# Patient Record
Sex: MALE | Race: WHITE | NOT HISPANIC OR LATINO | Employment: OTHER | URBAN - METROPOLITAN AREA
[De-identification: names, ages, dates, MRNs, and addresses within clinical notes are randomized per-mention and may not be internally consistent; named-entity substitution may affect disease eponyms.]

---

## 2023-03-02 ENCOUNTER — TRANSCRIBE ORDERS (OUTPATIENT)
Dept: PHYSICAL THERAPY | Facility: CLINIC | Age: 78
End: 2023-03-02
Payer: MEDICARE

## 2023-03-02 DIAGNOSIS — F03.90 DEMENTIA, UNSPECIFIED DEMENTIA SEVERITY, UNSPECIFIED DEMENTIA TYPE, UNSPECIFIED WHETHER BEHAVIORAL, PSYCHOTIC, OR MOOD DISTURBANCE OR ANXIETY: Primary | ICD-10-CM

## 2023-03-08 ENCOUNTER — OFFICE VISIT (OUTPATIENT)
Dept: PHYSICAL THERAPY | Facility: CLINIC | Age: 78
End: 2023-03-08
Payer: MEDICARE

## 2023-03-08 DIAGNOSIS — F03.90 DEMENTIA, UNSPECIFIED DEMENTIA SEVERITY, UNSPECIFIED DEMENTIA TYPE, UNSPECIFIED WHETHER BEHAVIORAL, PSYCHOTIC, OR MOOD DISTURBANCE OR ANXIETY: ICD-10-CM

## 2023-03-08 DIAGNOSIS — R47.01 APHASIA: ICD-10-CM

## 2023-03-08 DIAGNOSIS — R13.10 DYSPHAGIA, UNSPECIFIED TYPE: ICD-10-CM

## 2023-03-08 DIAGNOSIS — R41.841 COGNITIVE COMMUNICATION DEFICIT: Primary | ICD-10-CM

## 2023-03-08 DIAGNOSIS — R47.1 DYSARTHRIA: ICD-10-CM

## 2023-03-08 PROCEDURE — 92523 SPEECH SOUND LANG COMPREHEN: CPT | Performed by: SPEECH-LANGUAGE PATHOLOGIST

## 2023-03-08 PROCEDURE — 92610 EVALUATE SWALLOWING FUNCTION: CPT | Performed by: SPEECH-LANGUAGE PATHOLOGIST

## 2023-03-09 NOTE — PROGRESS NOTES
Outpatient Speech Language Pathology   Adult Speech Language Cognitive Initial Evaluation   [address]   [place], [place]       Patient Name: Dash Ross  : 1945  MRN: [id number]  Today's Date: 3/10/2023        Visit Date: 2023   There is no problem list on file for this patient.       No past medical history on file.     No past surgical history on file.      Visit Dx:    ICD-10-CM ICD-9-CM   1. Cognitive communication deficit  R41.841 799.52   2. Dementia, unspecified dementia severity, unspecified dementia type, unspecified whether behavioral, psychotic, or mood disturbance or anxiety ([facility])  F03.90 294.20   3. Dysphagia, unspecified type  R13.10 787.20   4. Dysarthria  R47.1 784.51   5. Aphasia  R47.01 784.3            OP SLP Assessment/Plan - 23 1300        SLP Assessment    Functional Problems Speech Language- Adult/Cognition;Swallowing  -RB    Impact on Function: Adult Speech Language/Cognition Difficulty communicating wants, needs, and ideas;Difficulty communicating in a medical emergency;Restrictions in personal and social life;Difficulty sequencing thoughts to express complex messages;Unable to understand written/spoken language;Difficulty following directions;Difficulty sequencing or problem solving to complete ADLs;Lack of insight or awareness of deficits, safety issues;Difficulty participating in avocational activities;Decreased recall of personal information and medical history;Trouble learning or remembering new information;Poor attention to task;Poor judgment;Requires supervision  -RB    Clinical Impression: Speech Language-Adult/Congnition Severe:;Cognitive Communication Impairment;Moderate-Severe:;Expressive Aphasia  -RB    Impact on Function: Swallowing --   WFL -RB    Clinical Impression: Swallowing --   no overt s/sx noted today -RB    Functional Problems Comment No durther evaluation of swallowing indicated at this time. He is experiencing severe negative  impact on QoL due to  cognitive deficits requiring  supervision and causing concerns for safety and life participation.  -RB    Clinical Impression Comments Would benefit from skilled ST for cognition. Swallowing WFL.  -RB    Please refer to paper survey for additional self-reported information Yes  -RB    Please refer to items scanned into chart for additional diagnostic informaiton and handouts as provided by clinician Yes  -RB    SLP Diagnosis cog/comm deficit, dysarthria, aphasia  -RB    Prognosis Fair (comment)  -RB    Patient/caregiver participated in establishment of treatment plan and goals Yes  -RB    Patient would benefit from skilled therapy intervention Yes  -RB       SLP Plan    Frequency 1x weekly  -RB    Duration 8 weeks  -RB    Planned CPT's? SLP INDIVIDUAL SPEECH THERAPY: 79582  -RB    Expected Duration of Therapy Session (SLP Eval) 45  -RB    Plan Comments Dependent on pt admission into [facility] care [facility]  -RB          User Key  (r) = Recorded By, (t) = Taken By, (c) = Cosigned By    Initials Name Provider Type    RB Eri Cleveland, SLP Speech and Language Pathologist                 SLP SLC Evaluation - 03/09/23 0900        Communication Assessment/Intervention    Document Type evaluation  -RB    Total Evaluation Minutes, SLP 45  -RB    Subjective Information no complaints  -RB    Patient Observations alert;cooperative;agree to therapy  -RB    Patient/Family/Caregiver Comments/Observations wife present  -RB    Patient Effort good  -RB    Symptoms Noted During/After Treatment none  -RB       General Information              Precautions/Limitations, Vision WFL with corrective lenses;for purposes of eval  -RB    Precautions/Limitations, Hearing WFL;for purposes of eval  -RB    Prior Level of Function-Communication cognitive-linguistic impairment  -RB    Plans/Goals Discussed with patient;spouse/S.O.  -RB    Barriers to Rehab cognitive status  -RB    Patient's Goals for Discharge patient could not  state  -RB    Family Goals for Discharge --   assess -RB       Expression Assessment/Intervention    Expression Assessment/Intervention --  -RB       Verbal Expression Assessment/Intervention    Conversational Discourse/Fluency severe impairment;perseverations;delayed responses;circumlocution;semantic paraphasias  -RB    Verbal Expression, Comment Confrontational naming and other expressive language to be assessed next session  -RB       Oral Motor Structure and Function    Dentition Assessment upper dentures/partial in place;missing teeth   lower front teeth missing, chronic dental disease -RB    Mucosal Quality moist, healthy  -RB       Oral Musculature and Cranial Nerve Assessment    Oral Motor General Assessment WFL  -RB       Motor Speech Assessment/Intervention    Motor Speech Function moderate impairment;severe impairment  -RB    Characteristics Consistent with Dysarthria slow rate;decreased intensity;decreased breath support;slurred speech  -RB    Conversational Speech (Communication) moderate impairment;severe impairment  -RB    Speech intelligibility 60%;in connected speech;with unfamiliar listener  -RB    Motor Speech, Comment Volume impacts intelligibilty. Speech difficulty with paraphasias impacting intelligibility and overall communication  -RB       Cursory Voice Assessment/Intervention    Quality and Resonance (Voice) breathy;hoarse  -RB    Voice, Comment Pt and spouse report voice has been raspy and quiet for 2-3 years. they do not want to pursue ENT at this time.  -RB       Cognitive Assessment Intervention- SLP    Cognition, Comment Pt demonstrates cognitive deficits but did not get formally evaluated today. Will be evaluated next session for language and other cognitive areas as appropriate.  -RB          User Key  (r) = Recorded By, (t) = Taken By, (c) = Cosigned By    Initials Name Provider Type    Eri Yu SLP Speech and Language Pathologist               SLP Adult Swallow Evaluation  "    Row Name 03/09/23 0800       Rehab Evaluation    Document Type evaluation  -RB    Total Evaluation Minutes, SLP 45  -RB    Subjective Information no complaints  -RB    Patient Observations cooperative;agree to therapy;alert  -RB    Patient/Family/Caregiver Comments/Observations pt's wife present  -RB    Patient Effort excellent  -RB    Symptoms Noted During/After Treatment none  -RB       General Information    Patient Profile Reviewed yes  -RB    Pertinent History Of Current Problem PMH: dementia. Interview was conducted with pt and spouse. He is experiencing decreased word finding and exhibiting jagron speech. Pt demonstrated quiet vocal quality, and pt and spouse report his voice has been quiet and raspy for 2-3 years. Spouse reported pt difficulty with swallowing pills with liquid and states that he has not tried to take them with applesauce. Spouse states that pt is \"a slow eater,\" feels that he chews his food for a long time, and exhibits \"a lot of mouth movements\" when eating. Spouse and pt report no coughing during or after eating. No hx of aspiration  PNA. Per spouse, pt takes small bites of food and has no food restrictions. Spouse states that pt \"had a good physical\" last week. Vision WFL with glasses. Hearing WFL. Pt goes to an adult day center and may possibly be moving into a memory care unit.  -RB    Current Method of Nutrition regular textures  -RB    Precautions/Limitations, Vision WFL with corrective lenses;for purposes of eval  -RB    Precautions/Limitations, Hearing WFL;for purposes of eval  -RB    Prior Level of Function-Communication WFL  -RB    Prior Level of Function-Swallowing no diet consistency restrictions  -RB    Plans/Goals Discussed with patient;spouse/S.O.  -RB    Barriers to Rehab cognitive status  -RB    Patient's Goals for Discharge patient could not state  -RB    Family Goals for Discharge --  assess swallow  -RB       Oral Motor Structure and Function    Dentition Assessment " upper dentures/partial in place;missing teeth  lower front teeth missing, chronic dental disease  -RB    Secretion Management WNL/WFL  -RB    Mucosal Quality moist, healthy  -RB    Volitional Swallow WFL  -RB       Oral Musculature and Cranial Nerve Assessment    Oral Motor General Assessment WFL  -RB       General Eating/Swallowing Observations    Respiratory Support Currently in Use room air  -RB    Eating/Swallowing Skills self-fed;impulsive self-feeding behaviors  -RB    Positioning During Eating upright in chair  -RB    Utensils Used spoon;cup;straw  -RB    Consistencies Trialed regular textures;mixed consistency;thin liquids;pureed  -RB       Clinical Swallow Eval    Oral Prep Phase WFL  -RB    Oral Transit WFL  -RB    Oral Residue WFL  -RB    Pharyngeal Phase no overt signs/symptoms of pharyngeal impairment  -RB    Esophageal Phase unremarkable  -RB    Clinical Swallow Evaluation Summary EAT-10 was administered to pt and spouse. Total score was a 15/40. Pt scored severe for extra effort taken to swallow pills and solids. He scored 2 (somewhat problematic) for swallowing liquids, food sticking in throat, and swallow is stressful. Pt's wife reports prolonged mastication with some solids. Not noted today.  -RB          User Key  (r) = Recorded By, (t) = Taken By, (c) = Cosigned By    Initials Name Provider Type    Eri Yu, SLP Speech and Language Pathologist                     ACTIVITY  ACCURACY ASSISTANCE NEEDED   LTGs:   Pt will maintain his cognitive function to participate in avocational activities in 80% of opportunities with minimal cueing.    Pt will be able to interact safely in home environment in 80% of opportunities and with minimal cueing        STG:  Pt’s memory skills will be enhanced as reported by patient and caregiver using external memory aides 80% of the time min cues        STG:   Pt will improve expressive/receptive language skills by engaging with avocational management material  (I.e., itineraries, cards, tasks lists, calendars, etc) at 80% min cues        STG:  Pt will utilize word finding strategies in structured conversation at 80% min cues      STG:   Pt and family will utilize visual aids for participation in daily activities at 90% with min cues          Certification: 3/8/23-6/7/23             OP SLP Education     Row Name 03/09/23 1400       Education    Barriers to Learning Decreased comprehension  -RB    Action Taken to Address Barriers --  Additional/differentiated models and prompts for comprehension  -RB    Education Provided Described results of evaluation;Patient expressed understanding of evaluation;Family/caregivers expressed understanding of evaluation;Patient participated in establishing goals and treatment plan;Family/caregivers participated in establishing goals and treatment plan;Patient demonstrated recommended strategies;Family/caregivers demonstrated recommended strategies  -RB    Assessed Learning needs;Learning motivation;Learning preferences;Learning readiness  -RB    Learning Motivation Moderate  -RB    Learning Method Explanation;Demonstration;Teach back;Written materials  -RB    Teaching Response Verbalized understanding;Demonstrated understanding;Reinforcement needed  -RB    Education Comments HEP: Education on pill swallowing, compensatory strategies for oral dysphagia  -RB          User Key  (r) = Recorded By, (t) = Taken By, (c) = Cosigned By    Initials Name Effective Dates    Eri Yu SLP 06/16/21 -                        [facility] Speech Language Pathology   [address]  [place] [address]  Eri Cleveland MA CCC-SLP [facility] license: [id number]        PHYSICIAN: Radha Donald PA    NPI: [id number]         I certify that the therapy services are furnished while this patient is under my care.  The services outlined above are required by this patient, and will be reviewed every 90 days.                PHYSICIAN:                                          DATE:      Please sign and return via fax to [phone].   Thank you,   [facility].   3/10/2023